# Patient Record
Sex: FEMALE | Race: OTHER | URBAN - METROPOLITAN AREA
[De-identification: names, ages, dates, MRNs, and addresses within clinical notes are randomized per-mention and may not be internally consistent; named-entity substitution may affect disease eponyms.]

---

## 2022-03-29 ENCOUNTER — CONSULTATION/EVALUATION (OUTPATIENT)
Dept: URBAN - METROPOLITAN AREA CLINIC 21 | Facility: CLINIC | Age: 72
End: 2022-03-29

## 2022-03-29 DIAGNOSIS — H35.3131: ICD-10-CM

## 2022-03-29 DIAGNOSIS — H25.812: ICD-10-CM

## 2022-03-29 PROCEDURE — 92004 COMPRE OPH EXAM NEW PT 1/>: CPT

## 2022-03-29 PROCEDURE — 92134 CPTRZ OPH DX IMG PST SGM RTA: CPT

## 2022-03-29 ASSESSMENT — KERATOMETRY
OD_K1POWER_DIOPTERS: 42.25
OS_K2POWER_DIOPTERS: 44.75
OD_AXISANGLE_DEGREES: 119
OD_AXISANGLE2_DEGREES: 29
OD_K2POWER_DIOPTERS: 42.75
OS_AXISANGLE2_DEGREES: 106
OS_AXISANGLE_DEGREES: 016
OS_K1POWER_DIOPTERS: 43.75

## 2022-03-29 ASSESSMENT — TONOMETRY
OS_IOP_MMHG: 16
OD_IOP_MMHG: 14

## 2022-03-29 ASSESSMENT — VISUAL ACUITY
OU_SC: 20/50
OS_SC: 20/80
OD_SC: 20/50

## 2022-03-29 NOTE — PATIENT DISCUSSION
Surgery Counseling: I have discussed the option of glasses versus cataract surgery versus following. It was explained that when the patient’s vision no longer meets their visual needs and a glasses prescription does not improve visual symptoms, the option of cataract surgery is a reasonable next step. It was explained that there is no guarantee that removing the cataract will improve their visual symptoms, however; it is believed that the cataract is contributing to the patient's visual impairment and surgery may improve both the visual and functional status of the patient. The risks, benefits and alternatives of surgery were discussed with the patient. After this discussion, the patient desires to proceed with cataract surgery with implantation of an intraocular lens to .

## 2022-03-29 NOTE — PATIENT DISCUSSION
Discussed risk of recurrent AION@ time of Cataract Surgery . PT reports that she had the same conversation with Dr Stephanie Mckeon and Dr Zuleika Christine.

## 2022-05-09 ENCOUNTER — PRE-OP/H&P (OUTPATIENT)
Dept: URBAN - METROPOLITAN AREA CLINIC 20 | Facility: CLINIC | Age: 72
End: 2022-05-09

## 2022-05-09 VITALS
HEART RATE: 76 BPM | HEIGHT: 69 IN | WEIGHT: 126 LBS | DIASTOLIC BLOOD PRESSURE: 64 MMHG | SYSTOLIC BLOOD PRESSURE: 134 MMHG | BODY MASS INDEX: 18.66 KG/M2

## 2022-05-09 DIAGNOSIS — H25.813: ICD-10-CM

## 2022-05-09 PROCEDURE — 92136 OPHTHALMIC BIOMETRY: CPT

## 2022-05-09 PROCEDURE — 99211PRE PRE OP VISIT

## 2022-05-09 ASSESSMENT — KERATOMETRY
OD_AXISANGLE_DEGREES: 119
OS_K2POWER_DIOPTERS: 44.75
OS_AXISANGLE_DEGREES: 016
OS_AXISANGLE2_DEGREES: 106
OD_K2POWER_DIOPTERS: 42.75
OS_K1POWER_DIOPTERS: 43.75
OD_AXISANGLE2_DEGREES: 29
OD_K1POWER_DIOPTERS: 42.25

## 2022-05-09 NOTE — PATIENT DISCUSSION
Discussed risk of recurrent AION@ time of Cataract Surgery . PT reports that she had the same conversation with Dr Fatuma Huynh and Dr Roshan Adam.

## 2023-04-04 ENCOUNTER — POST-OP (OUTPATIENT)
Dept: URBAN - METROPOLITAN AREA CLINIC 21 | Facility: CLINIC | Age: 73
End: 2023-04-04

## 2023-04-04 DIAGNOSIS — Z96.1: ICD-10-CM

## 2023-04-04 PROCEDURE — 99024 POSTOP FOLLOW-UP VISIT: CPT

## 2023-04-04 ASSESSMENT — KERATOMETRY
OD_K1POWER_DIOPTERS: 42.25
OS_AXISANGLE_DEGREES: 016
OS_K1POWER_DIOPTERS: 43.75
OD_K2POWER_DIOPTERS: 42.75
OD_AXISANGLE2_DEGREES: 29
OS_AXISANGLE2_DEGREES: 106
OD_AXISANGLE_DEGREES: 119
OS_K2POWER_DIOPTERS: 44.75

## 2023-04-04 ASSESSMENT — VISUAL ACUITY: OS_SC: 20/100-1

## 2023-04-04 ASSESSMENT — TONOMETRY: OS_IOP_MMHG: 10

## 2023-04-06 ENCOUNTER — POST-OP (OUTPATIENT)
Dept: URBAN - METROPOLITAN AREA CLINIC 20 | Facility: CLINIC | Age: 73
End: 2023-04-06

## 2023-04-06 DIAGNOSIS — H26.491: ICD-10-CM

## 2023-04-06 PROCEDURE — 66821 AFTER CATARACT LASER SURGERY: CPT

## 2023-04-06 PROCEDURE — 99214 OFFICE O/P EST MOD 30 MIN: CPT

## 2023-04-06 ASSESSMENT — VISUAL ACUITY: OD_SC: 20/40-2

## 2023-04-06 ASSESSMENT — KERATOMETRY
OS_K2POWER_DIOPTERS: 44.75
OD_AXISANGLE_DEGREES: 119
OD_K1POWER_DIOPTERS: 42.25
OD_AXISANGLE2_DEGREES: 29
OS_AXISANGLE_DEGREES: 016
OD_K2POWER_DIOPTERS: 42.75
OS_K1POWER_DIOPTERS: 43.75
OS_AXISANGLE2_DEGREES: 106

## 2023-04-06 ASSESSMENT — TONOMETRY: OD_IOP_MMHG: 11

## 2023-04-18 ENCOUNTER — POST-OP (OUTPATIENT)
Dept: URBAN - METROPOLITAN AREA CLINIC 21 | Facility: CLINIC | Age: 73
End: 2023-04-18

## 2023-04-18 DIAGNOSIS — Z98.890: ICD-10-CM

## 2023-04-18 PROCEDURE — 99024 POSTOP FOLLOW-UP VISIT: CPT

## 2023-04-18 ASSESSMENT — KERATOMETRY
OD_AXISANGLE2_DEGREES: 29
OS_K2POWER_DIOPTERS: 42.75
OS_K1POWER_DIOPTERS: 42.50
OD_K1POWER_DIOPTERS: 42.25
OS_AXISANGLE2_DEGREES: 139
OS_AXISANGLE_DEGREES: 049
OS_AXISANGLE2_DEGREES: 106
OD_AXISANGLE_DEGREES: 102
OD_K1POWER_DIOPTERS: 42.00
OS_K2POWER_DIOPTERS: 44.75
OD_K2POWER_DIOPTERS: 42.75
OD_AXISANGLE2_DEGREES: 12
OD_AXISANGLE_DEGREES: 119
OS_K1POWER_DIOPTERS: 43.75
OS_AXISANGLE_DEGREES: 016

## 2023-04-18 ASSESSMENT — VISUAL ACUITY
OS_SC: 20/400
OU_SC: 20/20
OD_SC: 20/20-1

## 2023-04-18 ASSESSMENT — TONOMETRY
OD_IOP_MMHG: 15
OS_IOP_MMHG: 14

## 2023-05-03 ENCOUNTER — TECH ONLY (OUTPATIENT)
Dept: URBAN - METROPOLITAN AREA SURGERY 8 | Facility: SURGERY | Age: 73
End: 2023-05-03

## 2023-05-03 DIAGNOSIS — H47.013: ICD-10-CM

## 2023-05-03 PROCEDURE — 92083 EXTENDED VISUAL FIELD XM: CPT

## 2023-05-03 PROCEDURE — 99211T TECH SERVICE

## 2023-12-15 ENCOUNTER — ESTABLISHED PATIENT (OUTPATIENT)
Dept: URBAN - METROPOLITAN AREA CLINIC 20 | Facility: CLINIC | Age: 73
End: 2023-12-15

## 2023-12-15 DIAGNOSIS — H47.013: ICD-10-CM

## 2023-12-15 DIAGNOSIS — H26.492: ICD-10-CM

## 2023-12-15 PROCEDURE — 66821 AFTER CATARACT LASER SURGERY: CPT

## 2023-12-15 PROCEDURE — 99213 OFFICE O/P EST LOW 20 MIN: CPT

## 2023-12-15 ASSESSMENT — KERATOMETRY
OD_AXISANGLE_DEGREES: 112
OS_K1POWER_DIOPTERS: 42.00
OS_K2POWER_DIOPTERS: 42.50
OD_K2POWER_DIOPTERS: 42.75
OD_AXISANGLE2_DEGREES: 22
OS_AXISANGLE_DEGREES: 68
OS_AXISANGLE2_DEGREES: 158
OD_K1POWER_DIOPTERS: 41.75

## 2023-12-15 ASSESSMENT — VISUAL ACUITY
OS_CC: 20/100
OU_CC: 20/20-1
OD_CC: 20/20-1

## 2023-12-15 ASSESSMENT — TONOMETRY
OS_IOP_MMHG: 10
OD_IOP_MMHG: 8

## 2024-01-02 ENCOUNTER — POST-OP (OUTPATIENT)
Dept: URBAN - METROPOLITAN AREA CLINIC 21 | Facility: CLINIC | Age: 74
End: 2024-01-02

## 2024-01-02 DIAGNOSIS — Z98.890: ICD-10-CM

## 2024-01-02 PROCEDURE — 99024 POSTOP FOLLOW-UP VISIT: CPT

## 2024-01-02 ASSESSMENT — TONOMETRY
OS_IOP_MMHG: 12
OD_IOP_MMHG: 14

## 2024-01-02 ASSESSMENT — VISUAL ACUITY
OD_CC: 20/20
OS_CC: 20/80